# Patient Record
Sex: FEMALE | Race: BLACK OR AFRICAN AMERICAN
[De-identification: names, ages, dates, MRNs, and addresses within clinical notes are randomized per-mention and may not be internally consistent; named-entity substitution may affect disease eponyms.]

---

## 2017-12-29 ENCOUNTER — APPOINTMENT (OUTPATIENT)
Dept: RADIOLOGY | Facility: IMAGING CENTER | Age: 62
End: 2017-12-29
Payer: COMMERCIAL

## 2017-12-29 ENCOUNTER — OUTPATIENT (OUTPATIENT)
Dept: OUTPATIENT SERVICES | Facility: HOSPITAL | Age: 62
LOS: 1 days | End: 2017-12-29
Payer: COMMERCIAL

## 2017-12-29 ENCOUNTER — APPOINTMENT (OUTPATIENT)
Dept: MAMMOGRAPHY | Facility: IMAGING CENTER | Age: 62
End: 2017-12-29
Payer: COMMERCIAL

## 2017-12-29 DIAGNOSIS — Z00.8 ENCOUNTER FOR OTHER GENERAL EXAMINATION: ICD-10-CM

## 2017-12-29 PROCEDURE — 77067 SCR MAMMO BI INCL CAD: CPT

## 2017-12-29 PROCEDURE — G0202: CPT | Mod: 26

## 2017-12-29 PROCEDURE — 77080 DXA BONE DENSITY AXIAL: CPT | Mod: 26

## 2017-12-29 PROCEDURE — 77063 BREAST TOMOSYNTHESIS BI: CPT | Mod: 26

## 2017-12-29 PROCEDURE — 77080 DXA BONE DENSITY AXIAL: CPT

## 2017-12-29 PROCEDURE — 77063 BREAST TOMOSYNTHESIS BI: CPT

## 2018-03-16 ENCOUNTER — APPOINTMENT (OUTPATIENT)
Dept: UROGYNECOLOGY | Facility: CLINIC | Age: 63
End: 2018-03-16
Payer: COMMERCIAL

## 2018-03-16 VITALS
SYSTOLIC BLOOD PRESSURE: 160 MMHG | DIASTOLIC BLOOD PRESSURE: 90 MMHG | WEIGHT: 163 LBS | HEIGHT: 65 IN | BODY MASS INDEX: 27.16 KG/M2

## 2018-03-16 DIAGNOSIS — N39.41 URGE INCONTINENCE: ICD-10-CM

## 2018-03-16 DIAGNOSIS — R35.0 FREQUENCY OF MICTURITION: ICD-10-CM

## 2018-03-16 DIAGNOSIS — R39.15 URGENCY OF URINATION: ICD-10-CM

## 2018-03-16 DIAGNOSIS — K46.9 UNSPECIFIED ABDOMINAL HERNIA W/OUT OBSTRUCTION OR GANGRENE: ICD-10-CM

## 2018-03-16 LAB
BILIRUB UR QL STRIP: NORMAL
CLARITY UR: CLEAR
COLLECTION METHOD: NORMAL
GLUCOSE UR-MCNC: NORMAL
HCG UR QL: 0.2 EU/DL
HGB UR QL STRIP.AUTO: NORMAL
KETONES UR-MCNC: NORMAL
LEUKOCYTE ESTERASE UR QL STRIP: NORMAL
NITRITE UR QL STRIP: NORMAL
PH UR STRIP: 5.5
PROT UR STRIP-MCNC: NORMAL
SP GR UR STRIP: 1.02

## 2018-03-16 PROCEDURE — 99204 OFFICE O/P NEW MOD 45 MIN: CPT | Mod: 25

## 2018-03-16 PROCEDURE — 51741 ELECTRO-UROFLOWMETRY FIRST: CPT

## 2018-03-16 PROCEDURE — 51725 SIMPLE CYSTOMETROGRAM: CPT

## 2018-03-16 PROCEDURE — 81003 URINALYSIS AUTO W/O SCOPE: CPT | Mod: QW

## 2018-03-16 RX ORDER — LACTULOSE 10 G/15ML
10 SOLUTION ORAL
Qty: 240 | Refills: 0 | Status: DISCONTINUED | COMMUNITY
Start: 2018-01-05

## 2018-03-16 RX ORDER — ATORVASTATIN CALCIUM 10 MG/1
10 TABLET, FILM COATED ORAL
Qty: 30 | Refills: 0 | Status: DISCONTINUED | COMMUNITY
Start: 2017-07-26

## 2018-03-16 RX ORDER — CHLORHEXIDINE GLUCONATE, 0.12% ORAL RINSE 1.2 MG/ML
0.12 SOLUTION DENTAL
Qty: 473 | Refills: 0 | Status: DISCONTINUED | COMMUNITY
Start: 2017-01-18

## 2018-03-16 RX ORDER — AMOXICILLIN 500 MG/1
500 TABLET, FILM COATED ORAL
Qty: 21 | Refills: 0 | Status: DISCONTINUED | COMMUNITY
Start: 2018-02-14

## 2018-03-19 LAB
APPEARANCE: CLEAR
BACTERIA UR CULT: NORMAL
BACTERIA: NEGATIVE
BILIRUBIN URINE: NEGATIVE
BLOOD URINE: NEGATIVE
COLOR: YELLOW
GLUCOSE QUALITATIVE U: NEGATIVE
HYALINE CASTS: 0 /LPF
KETONES URINE: NEGATIVE
LEUKOCYTE ESTERASE URINE: NEGATIVE
MICROSCOPIC-UA: NORMAL
NITRITE URINE: NEGATIVE
PH URINE: 6
PROTEIN URINE: NEGATIVE
RED BLOOD CELLS URINE: 0 /HPF
SPECIFIC GRAVITY URINE: 1.02
SQUAMOUS EPITHELIAL CELLS: 2 /HPF
UROBILINOGEN URINE: NEGATIVE
WHITE BLOOD CELLS URINE: 1 /HPF

## 2018-04-26 ENCOUNTER — APPOINTMENT (OUTPATIENT)
Dept: UROGYNECOLOGY | Facility: CLINIC | Age: 63
End: 2018-04-26

## 2018-09-28 ENCOUNTER — APPOINTMENT (OUTPATIENT)
Dept: ULTRASOUND IMAGING | Facility: IMAGING CENTER | Age: 63
End: 2018-09-28
Payer: COMMERCIAL

## 2018-09-28 ENCOUNTER — OUTPATIENT (OUTPATIENT)
Dept: OUTPATIENT SERVICES | Facility: HOSPITAL | Age: 63
LOS: 1 days | End: 2018-09-28
Payer: COMMERCIAL

## 2018-09-28 DIAGNOSIS — Z00.8 ENCOUNTER FOR OTHER GENERAL EXAMINATION: ICD-10-CM

## 2018-09-28 PROCEDURE — 93975 VASCULAR STUDY: CPT

## 2018-09-28 PROCEDURE — 93975 VASCULAR STUDY: CPT | Mod: 26

## 2019-02-22 ENCOUNTER — OUTPATIENT (OUTPATIENT)
Dept: OUTPATIENT SERVICES | Facility: HOSPITAL | Age: 64
LOS: 1 days | End: 2019-02-22
Payer: COMMERCIAL

## 2019-02-22 ENCOUNTER — APPOINTMENT (OUTPATIENT)
Dept: MAMMOGRAPHY | Facility: IMAGING CENTER | Age: 64
End: 2019-02-22
Payer: COMMERCIAL

## 2019-02-22 DIAGNOSIS — Z00.8 ENCOUNTER FOR OTHER GENERAL EXAMINATION: ICD-10-CM

## 2019-02-22 PROCEDURE — 77067 SCR MAMMO BI INCL CAD: CPT | Mod: 26

## 2019-02-22 PROCEDURE — 77063 BREAST TOMOSYNTHESIS BI: CPT

## 2019-02-22 PROCEDURE — 77067 SCR MAMMO BI INCL CAD: CPT

## 2019-02-22 PROCEDURE — 77063 BREAST TOMOSYNTHESIS BI: CPT | Mod: 26

## 2019-04-17 ENCOUNTER — OUTPATIENT (OUTPATIENT)
Dept: OUTPATIENT SERVICES | Facility: HOSPITAL | Age: 64
LOS: 1 days | End: 2019-04-17
Payer: COMMERCIAL

## 2019-04-17 ENCOUNTER — APPOINTMENT (OUTPATIENT)
Dept: CT IMAGING | Facility: IMAGING CENTER | Age: 64
End: 2019-04-17
Payer: COMMERCIAL

## 2019-04-17 DIAGNOSIS — Z00.8 ENCOUNTER FOR OTHER GENERAL EXAMINATION: ICD-10-CM

## 2019-04-17 PROCEDURE — 71250 CT THORAX DX C-: CPT | Mod: 26

## 2019-04-17 PROCEDURE — 71250 CT THORAX DX C-: CPT

## 2019-08-06 ENCOUNTER — APPOINTMENT (OUTPATIENT)
Dept: OBGYN | Facility: CLINIC | Age: 64
End: 2019-08-06
Payer: COMMERCIAL

## 2019-08-06 VITALS
BODY MASS INDEX: 29.19 KG/M2 | HEIGHT: 64 IN | SYSTOLIC BLOOD PRESSURE: 116 MMHG | DIASTOLIC BLOOD PRESSURE: 60 MMHG | WEIGHT: 171 LBS

## 2019-08-06 DIAGNOSIS — Z12.11 ENCOUNTER FOR SCREENING FOR MALIGNANT NEOPLASM OF COLON: ICD-10-CM

## 2019-08-06 PROCEDURE — 99396 PREV VISIT EST AGE 40-64: CPT

## 2019-08-06 NOTE — HISTORY OF PRESENT ILLNESS
[Postmenopausal] : is postmenopausal [Good] : being in good health [HPV Vaccine NA Due to Age] : HPV vaccine not available to patient due to age [Last Mammogram ___] : Last Mammogram was [unfilled] [Last Bone Density ___] : Last bone density studies [unfilled] [Last Colonoscopy ___] : Last colonoscopy [unfilled] [Last Pap ___] : Last cervical pap smear was [unfilled]

## 2019-08-06 NOTE — COUNSELING
[Nutrition] : nutrition [Breast Self Exam] : breast self exam [Vitamins/Supplements] : vitamins/supplements [Exercise] : exercise [FreeTextEntry2] : Terrance wilocx

## 2019-08-06 NOTE — PHYSICAL EXAM
[Alert] : alert [Awake] : awake [Soft] : soft [Oriented x3] : oriented to person, place, and time [Normal] : cervix [Uterine Adnexae] : were not tender and not enlarged [No Bleeding] : there was no active vaginal bleeding [Acute Distress] : no acute distress [Mass] : no breast mass [Nipple Discharge] : no nipple discharge [Axillary LAD] : no axillary lymphadenopathy [Tender] : non tender

## 2019-08-07 LAB — HPV HIGH+LOW RISK DNA PNL CVX: NOT DETECTED

## 2019-08-11 LAB — CYTOLOGY CVX/VAG DOC THIN PREP: ABNORMAL

## 2020-09-02 ENCOUNTER — APPOINTMENT (OUTPATIENT)
Dept: MAMMOGRAPHY | Facility: IMAGING CENTER | Age: 65
End: 2020-09-02

## 2021-01-07 ENCOUNTER — APPOINTMENT (OUTPATIENT)
Dept: OBGYN | Facility: CLINIC | Age: 66
End: 2021-01-07

## 2021-01-08 ENCOUNTER — APPOINTMENT (OUTPATIENT)
Dept: OBGYN | Facility: CLINIC | Age: 66
End: 2021-01-08
Payer: MEDICARE

## 2021-01-08 VITALS
TEMPERATURE: 97.3 F | BODY MASS INDEX: 27.49 KG/M2 | HEART RATE: 68 BPM | OXYGEN SATURATION: 98 % | DIASTOLIC BLOOD PRESSURE: 80 MMHG | HEIGHT: 64 IN | SYSTOLIC BLOOD PRESSURE: 120 MMHG | WEIGHT: 161 LBS

## 2021-01-08 DIAGNOSIS — R92.2 INCONCLUSIVE MAMMOGRAM: ICD-10-CM

## 2021-01-08 DIAGNOSIS — Z12.39 ENCOUNTER FOR OTHER SCREENING FOR MALIGNANT NEOPLASM OF BREAST: ICD-10-CM

## 2021-01-08 PROCEDURE — G0101: CPT

## 2021-01-08 RX ORDER — ROSUVASTATIN CALCIUM 10 MG/1
10 TABLET, FILM COATED ORAL
Refills: 0 | Status: ACTIVE | COMMUNITY

## 2021-01-08 RX ORDER — CLONIDINE HYDROCHLORIDE 0.2 MG/1
0.2 TABLET ORAL
Qty: 90 | Refills: 0 | Status: DISCONTINUED | COMMUNITY
Start: 2017-10-11 | End: 2021-01-08

## 2021-01-08 RX ORDER — TELMISARTAN AND HYDROCHLOROTHIAZIDE 80; 12.5 MG/1; MG/1
80-12.5 TABLET ORAL
Refills: 0 | Status: ACTIVE | COMMUNITY

## 2021-01-09 LAB — HPV HIGH+LOW RISK DNA PNL CVX: NOT DETECTED

## 2021-01-12 LAB — CYTOLOGY CVX/VAG DOC THIN PREP: ABNORMAL

## 2021-01-24 ENCOUNTER — TRANSCRIPTION ENCOUNTER (OUTPATIENT)
Age: 66
End: 2021-01-24

## 2021-03-03 ENCOUNTER — NON-APPOINTMENT (OUTPATIENT)
Age: 66
End: 2021-03-03

## 2021-05-10 ENCOUNTER — APPOINTMENT (OUTPATIENT)
Dept: GASTROENTEROLOGY | Facility: CLINIC | Age: 66
End: 2021-05-10

## 2023-04-14 NOTE — REASON FOR VISIT
How Severe Are Your Spot(S)?: moderate What Is The Reason For Today's Visit?: Full Body Skin Examination What Is The Reason For Today's Visit? (Being Monitored For X): concerning skin lesions on an annual basis [Annual] : an annual visit.

## 2023-04-21 ENCOUNTER — APPOINTMENT (OUTPATIENT)
Dept: OBGYN | Facility: CLINIC | Age: 68
End: 2023-04-21
Payer: MEDICARE

## 2023-04-21 VITALS — SYSTOLIC BLOOD PRESSURE: 148 MMHG | HEART RATE: 62 BPM | DIASTOLIC BLOOD PRESSURE: 88 MMHG

## 2023-04-21 VITALS
WEIGHT: 160 LBS | HEIGHT: 64 IN | BODY MASS INDEX: 27.31 KG/M2 | SYSTOLIC BLOOD PRESSURE: 158 MMHG | DIASTOLIC BLOOD PRESSURE: 80 MMHG | HEART RATE: 69 BPM

## 2023-04-21 DIAGNOSIS — Z01.419 ENCOUNTER FOR GYNECOLOGICAL EXAMINATION (GENERAL) (ROUTINE) W/OUT ABNORMAL FINDINGS: ICD-10-CM

## 2023-04-21 PROCEDURE — G0101: CPT

## 2023-04-23 LAB — HPV HIGH+LOW RISK DNA PNL CVX: NOT DETECTED

## 2023-04-27 LAB — CYTOLOGY CVX/VAG DOC THIN PREP: NORMAL

## 2023-11-30 ENCOUNTER — NON-APPOINTMENT (OUTPATIENT)
Age: 68
End: 2023-11-30

## 2023-12-06 ENCOUNTER — TRANSCRIPTION ENCOUNTER (OUTPATIENT)
Age: 68
End: 2023-12-06

## 2023-12-06 ENCOUNTER — NON-APPOINTMENT (OUTPATIENT)
Age: 68
End: 2023-12-06

## 2023-12-06 DIAGNOSIS — N60.99 UNSPECIFIED BENIGN MAMMARY DYSPLASIA OF UNSPECIFIED BREAST: ICD-10-CM

## 2023-12-07 ENCOUNTER — NON-APPOINTMENT (OUTPATIENT)
Age: 68
End: 2023-12-07

## 2024-01-11 ENCOUNTER — APPOINTMENT (OUTPATIENT)
Dept: SURGICAL ONCOLOGY | Facility: CLINIC | Age: 69
End: 2024-01-11
Payer: MEDICARE

## 2024-01-11 VITALS
DIASTOLIC BLOOD PRESSURE: 81 MMHG | HEIGHT: 64 IN | OXYGEN SATURATION: 96 % | RESPIRATION RATE: 16 BRPM | HEART RATE: 59 BPM | SYSTOLIC BLOOD PRESSURE: 150 MMHG | WEIGHT: 164 LBS | BODY MASS INDEX: 28 KG/M2

## 2024-01-11 PROCEDURE — 99205 OFFICE O/P NEW HI 60 MIN: CPT

## 2024-01-15 ENCOUNTER — OUTPATIENT (OUTPATIENT)
Dept: OUTPATIENT SERVICES | Facility: HOSPITAL | Age: 69
LOS: 1 days | End: 2024-01-15
Payer: MEDICARE

## 2024-01-15 ENCOUNTER — APPOINTMENT (OUTPATIENT)
Dept: MRI IMAGING | Facility: CLINIC | Age: 69
End: 2024-01-15
Payer: MEDICARE

## 2024-01-15 DIAGNOSIS — N60.99 UNSPECIFIED BENIGN MAMMARY DYSPLASIA OF UNSPECIFIED BREAST: ICD-10-CM

## 2024-01-15 PROCEDURE — C8937: CPT

## 2024-01-15 PROCEDURE — C8908: CPT

## 2024-01-15 PROCEDURE — A9585: CPT

## 2024-01-15 PROCEDURE — 77049 MRI BREAST C-+ W/CAD BI: CPT | Mod: 26

## 2024-01-18 ENCOUNTER — OUTPATIENT (OUTPATIENT)
Dept: OUTPATIENT SERVICES | Facility: HOSPITAL | Age: 69
LOS: 1 days | End: 2024-01-18
Payer: MEDICARE

## 2024-01-18 VITALS
OXYGEN SATURATION: 95 % | HEART RATE: 64 BPM | DIASTOLIC BLOOD PRESSURE: 88 MMHG | TEMPERATURE: 98 F | WEIGHT: 161.82 LBS | RESPIRATION RATE: 16 BRPM | SYSTOLIC BLOOD PRESSURE: 167 MMHG | HEIGHT: 64 IN

## 2024-01-18 DIAGNOSIS — I10 ESSENTIAL (PRIMARY) HYPERTENSION: ICD-10-CM

## 2024-01-18 DIAGNOSIS — Z01.818 ENCOUNTER FOR OTHER PREPROCEDURAL EXAMINATION: ICD-10-CM

## 2024-01-18 DIAGNOSIS — E78.5 HYPERLIPIDEMIA, UNSPECIFIED: ICD-10-CM

## 2024-01-18 DIAGNOSIS — N60.89 OTHER BENIGN MAMMARY DYSPLASIAS OF UNSPECIFIED BREAST: ICD-10-CM

## 2024-01-18 DIAGNOSIS — N60.99 UNSPECIFIED BENIGN MAMMARY DYSPLASIA OF UNSPECIFIED BREAST: ICD-10-CM

## 2024-01-18 LAB
ANION GAP SERPL CALC-SCNC: 8 MMOL/L — SIGNIFICANT CHANGE UP (ref 5–17)
BUN SERPL-MCNC: 14 MG/DL — SIGNIFICANT CHANGE UP (ref 7–23)
CALCIUM SERPL-MCNC: 9.5 MG/DL — SIGNIFICANT CHANGE UP (ref 8.4–10.5)
CHLORIDE SERPL-SCNC: 104 MMOL/L — SIGNIFICANT CHANGE UP (ref 96–108)
CO2 SERPL-SCNC: 29 MMOL/L — SIGNIFICANT CHANGE UP (ref 22–31)
CREAT SERPL-MCNC: 0.75 MG/DL — SIGNIFICANT CHANGE UP (ref 0.5–1.3)
EGFR: 87 ML/MIN/1.73M2 — SIGNIFICANT CHANGE UP
GLUCOSE SERPL-MCNC: 89 MG/DL — SIGNIFICANT CHANGE UP (ref 70–99)
HCT VFR BLD CALC: 43.2 % — SIGNIFICANT CHANGE UP (ref 34.5–45)
HGB BLD-MCNC: 14.4 G/DL — SIGNIFICANT CHANGE UP (ref 11.5–15.5)
MCHC RBC-ENTMCNC: 30.1 PG — SIGNIFICANT CHANGE UP (ref 27–34)
MCHC RBC-ENTMCNC: 33.3 GM/DL — SIGNIFICANT CHANGE UP (ref 32–36)
MCV RBC AUTO: 90.2 FL — SIGNIFICANT CHANGE UP (ref 80–100)
NRBC # BLD: 0 /100 WBCS — SIGNIFICANT CHANGE UP (ref 0–0)
PLATELET # BLD AUTO: 265 K/UL — SIGNIFICANT CHANGE UP (ref 150–400)
POTASSIUM SERPL-MCNC: 4.1 MMOL/L — SIGNIFICANT CHANGE UP (ref 3.5–5.3)
POTASSIUM SERPL-SCNC: 4.1 MMOL/L — SIGNIFICANT CHANGE UP (ref 3.5–5.3)
RBC # BLD: 4.79 M/UL — SIGNIFICANT CHANGE UP (ref 3.8–5.2)
RBC # FLD: 12.1 % — SIGNIFICANT CHANGE UP (ref 10.3–14.5)
SODIUM SERPL-SCNC: 141 MMOL/L — SIGNIFICANT CHANGE UP (ref 135–145)
WBC # BLD: 5.84 K/UL — SIGNIFICANT CHANGE UP (ref 3.8–10.5)
WBC # FLD AUTO: 5.84 K/UL — SIGNIFICANT CHANGE UP (ref 3.8–10.5)

## 2024-01-18 PROCEDURE — G0463: CPT

## 2024-01-18 PROCEDURE — 36415 COLL VENOUS BLD VENIPUNCTURE: CPT

## 2024-01-18 PROCEDURE — 80048 BASIC METABOLIC PNL TOTAL CA: CPT

## 2024-01-18 PROCEDURE — 93010 ELECTROCARDIOGRAM REPORT: CPT | Mod: NC

## 2024-01-18 PROCEDURE — 85027 COMPLETE CBC AUTOMATED: CPT

## 2024-01-18 PROCEDURE — 93005 ELECTROCARDIOGRAM TRACING: CPT

## 2024-01-18 NOTE — H&P PST ADULT - NSANTHOSAYNRD_GEN_A_CORE
neck 15.5inches/No. MARLEEN screening performed.  STOP BANG Legend: 0-2 = LOW Risk; 3-4 = INTERMEDIATE Risk; 5-8 = HIGH Risk

## 2024-01-18 NOTE — H&P PST ADULT - NSICDXPASTMEDICALHX_GEN_ALL_CORE_FT
PAST MEDICAL HISTORY:  HTN (Hypertension)     Hyperlipidemia     Mass of right breast on mammogram     Uterine Leiomyoma

## 2024-01-18 NOTE — H&P PST ADULT - TEMPERATURE IN FAHRENHEIT (DEGREES F)
This patient is scheduled to follow up with you for HTN. She has a hx of elevated BP which was better with her pre-hospital regimen (included minoxidil). She was seen today for follow up and her BP is still very high. Can call or text me when you see her. She will return to my office for a few nurse visit to check BP.
98.2

## 2024-01-18 NOTE — H&P PST ADULT - PROBLEM SELECTOR PLAN 2
Pt instructed to take meds as prescribed - elevated BP during PST eval  Pending: M/C Pt instructed to take meds as prescribed

## 2024-01-18 NOTE — H&P PST ADULT - NSICDXFAMILYHX_GEN_ALL_CORE_FT
FAMILY HISTORY:  Sibling  Still living? Yes, Estimated age: Age Unknown  Family history of breast cancer in sister, Age at diagnosis: Age Unknown

## 2024-01-18 NOTE — H&P PST ADULT - HISTORY OF PRESENT ILLNESS
67yo female with medical HTN and HDL, reports annual mammogram indicate Benign mammary dysplasia, right breast and present today for PST for scheduled for Excision Right Breast Atypia on 1/24/2024

## 2024-01-18 NOTE — H&P PST ADULT - NEUROLOGICAL
Patient and family requesting cough syrup to go home with. OTC medications have not helped. madeleine Bills stated she will talk with Dr. Stephie Bolivar about this. Pt waiting on this before being discharged home. cranial nerves II-XII intact/sensation intact negative

## 2024-01-18 NOTE — H&P PST ADULT - PROBLEM SELECTOR PLAN 1
Pre-op instructions given. Pt verbalized understanding  Chlorhexidine wash instructions given  Pt instructed to hold all NSAIDs + herbal supplements/vitamins 7 days before surgery  Pending: lab results  Pending: M/C

## 2024-01-18 NOTE — H&P PST ADULT - NSICDXPASTSURGICALHX_GEN_ALL_CORE_FT
PAST SURGICAL HISTORY:  Dilatation curettage x2 approx. 8 years ago    Inguinal Hernia repair- right 1980's    Tubal Ligation

## 2024-01-18 NOTE — H&P PST ADULT - ATTENDING COMMENTS
68-year-old lady with atypical lobular hyperplasia of the right breast diagnosed on needle biopsy.  She is scheduled for excision with preoperative localization.    oncologic diagnosis, surgical approach, risks, benefits and possible surgical outcomes reviewed in detail, all questions answered.    Consent on chart

## 2024-01-19 ENCOUNTER — RESULT REVIEW (OUTPATIENT)
Age: 69
End: 2024-01-19

## 2024-01-19 ENCOUNTER — OUTPATIENT (OUTPATIENT)
Dept: OUTPATIENT SERVICES | Facility: HOSPITAL | Age: 69
LOS: 1 days | End: 2024-01-19
Payer: MEDICARE

## 2024-01-19 DIAGNOSIS — C80.1 MALIGNANT (PRIMARY) NEOPLASM, UNSPECIFIED: ICD-10-CM

## 2024-01-19 PROCEDURE — 88321 CONSLTJ&REPRT SLD PREP ELSWR: CPT

## 2024-01-20 PROBLEM — N63.10 UNSPECIFIED LUMP IN THE RIGHT BREAST, UNSPECIFIED QUADRANT: Chronic | Status: ACTIVE | Noted: 2024-01-18

## 2024-01-20 PROBLEM — E78.5 HYPERLIPIDEMIA, UNSPECIFIED: Chronic | Status: ACTIVE | Noted: 2024-01-18

## 2024-01-22 LAB — SURGICAL PATHOLOGY STUDY: SIGNIFICANT CHANGE UP

## 2024-01-23 ENCOUNTER — TRANSCRIPTION ENCOUNTER (OUTPATIENT)
Age: 69
End: 2024-01-23

## 2024-01-24 ENCOUNTER — RESULT REVIEW (OUTPATIENT)
Age: 69
End: 2024-01-24

## 2024-01-24 ENCOUNTER — OUTPATIENT (OUTPATIENT)
Dept: OUTPATIENT SERVICES | Facility: HOSPITAL | Age: 69
LOS: 1 days | End: 2024-01-24
Payer: MEDICARE

## 2024-01-24 ENCOUNTER — TRANSCRIPTION ENCOUNTER (OUTPATIENT)
Age: 69
End: 2024-01-24

## 2024-01-24 ENCOUNTER — APPOINTMENT (OUTPATIENT)
Dept: SURGICAL ONCOLOGY | Facility: HOSPITAL | Age: 69
End: 2024-01-24

## 2024-01-24 VITALS
OXYGEN SATURATION: 98 % | DIASTOLIC BLOOD PRESSURE: 85 MMHG | RESPIRATION RATE: 14 BRPM | HEIGHT: 64 IN | TEMPERATURE: 98 F | WEIGHT: 161.82 LBS | SYSTOLIC BLOOD PRESSURE: 132 MMHG | HEART RATE: 66 BPM

## 2024-01-24 VITALS
HEART RATE: 65 BPM | RESPIRATION RATE: 16 BRPM | TEMPERATURE: 97 F | SYSTOLIC BLOOD PRESSURE: 133 MMHG | OXYGEN SATURATION: 98 % | DIASTOLIC BLOOD PRESSURE: 73 MMHG

## 2024-01-24 DIAGNOSIS — N60.99 UNSPECIFIED BENIGN MAMMARY DYSPLASIA OF UNSPECIFIED BREAST: ICD-10-CM

## 2024-01-24 PROCEDURE — 76098 X-RAY EXAM SURGICAL SPECIMEN: CPT | Mod: 26

## 2024-01-24 PROCEDURE — C1889: CPT

## 2024-01-24 PROCEDURE — 88307 TISSUE EXAM BY PATHOLOGIST: CPT

## 2024-01-24 PROCEDURE — 19281 PERQ DEVICE BREAST 1ST IMAG: CPT | Mod: RT

## 2024-01-24 PROCEDURE — 19125 EXCISION BREAST LESION: CPT | Mod: RT

## 2024-01-24 PROCEDURE — 19301 PARTIAL MASTECTOMY: CPT | Mod: RT

## 2024-01-24 PROCEDURE — 76098 X-RAY EXAM SURGICAL SPECIMEN: CPT

## 2024-01-24 PROCEDURE — 88307 TISSUE EXAM BY PATHOLOGIST: CPT | Mod: 26

## 2024-01-24 PROCEDURE — 19281 PERQ DEVICE BREAST 1ST IMAG: CPT

## 2024-01-24 DEVICE — ARISTA 3GR: Type: IMPLANTABLE DEVICE | Site: RIGHT | Status: FUNCTIONAL

## 2024-01-24 RX ORDER — SODIUM CHLORIDE 9 MG/ML
1000 INJECTION, SOLUTION INTRAVENOUS
Refills: 0 | Status: DISCONTINUED | OUTPATIENT
Start: 2024-01-24 | End: 2024-01-24

## 2024-01-24 RX ORDER — HEPARIN SODIUM 5000 [USP'U]/ML
5000 INJECTION INTRAVENOUS; SUBCUTANEOUS ONCE
Refills: 0 | Status: COMPLETED | OUTPATIENT
Start: 2024-01-24 | End: 2024-01-24

## 2024-01-24 RX ORDER — ROSUVASTATIN CALCIUM 5 MG/1
1 TABLET ORAL
Refills: 0 | DISCHARGE

## 2024-01-24 RX ORDER — HYDROMORPHONE HYDROCHLORIDE 2 MG/ML
0.5 INJECTION INTRAMUSCULAR; INTRAVENOUS; SUBCUTANEOUS
Refills: 0 | Status: DISCONTINUED | OUTPATIENT
Start: 2024-01-24 | End: 2024-01-24

## 2024-01-24 RX ORDER — ONDANSETRON 8 MG/1
4 TABLET, FILM COATED ORAL ONCE
Refills: 0 | Status: DISCONTINUED | OUTPATIENT
Start: 2024-01-24 | End: 2024-01-24

## 2024-01-24 RX ORDER — METOPROLOL TARTRATE 50 MG
1 TABLET ORAL
Refills: 0 | DISCHARGE

## 2024-01-24 RX ORDER — HYDROCHLOROTHIAZIDE 25 MG
1 TABLET ORAL
Refills: 0 | DISCHARGE

## 2024-01-24 RX ORDER — HYDROMORPHONE HYDROCHLORIDE 2 MG/ML
1 INJECTION INTRAMUSCULAR; INTRAVENOUS; SUBCUTANEOUS
Refills: 0 | Status: DISCONTINUED | OUTPATIENT
Start: 2024-01-24 | End: 2024-01-24

## 2024-01-24 RX ORDER — TELMISARTAN 20 MG/1
1 TABLET ORAL
Refills: 0 | DISCHARGE

## 2024-01-24 RX ADMIN — HYDROMORPHONE HYDROCHLORIDE 0.5 MILLIGRAM(S): 2 INJECTION INTRAMUSCULAR; INTRAVENOUS; SUBCUTANEOUS at 11:18

## 2024-01-24 RX ADMIN — HEPARIN SODIUM 5000 UNIT(S): 5000 INJECTION INTRAVENOUS; SUBCUTANEOUS at 09:29

## 2024-01-24 RX ADMIN — SODIUM CHLORIDE 50 MILLILITER(S): 9 INJECTION, SOLUTION INTRAVENOUS at 08:06

## 2024-01-24 NOTE — ASU DISCHARGE PLAN (ADULT/PEDIATRIC) - ASU DC SPECIAL INSTRUCTIONSFT
Maintain mammary support for 48 hours after getting home.    At that time, may remove garment, surgical tape, and gauze.    Do not remove Steri-Strips.    May shower after above.    After showering, do not need to reapply a dressing.    Should wear mammary support, or own bra, even at night for the next 5 days.    Dr. Ruiz should call with pathology report in approximately 2 weeks, that conversation will determine further management Maintain mammary support for 48 hours after getting home.    At that time, may remove garment, surgical tape, and gauze.    Do not remove Steri-Strips.    May shower after above.    After showering, do not need to reapply a dressing.    Should wear mammary support, or own bra, even at night for the next 5 days.    Dr. Ruiz should call with pathology report in approximately 2 weeks, that conversation will determine further management    Pain medication is given immediately following your surgery to help with post-operative pain. Upon  your discharge home, we suggest scheduled doses of Acetaminophen (tylenol) every 6 hours and  Ibuprofen (Motrin) every 6 hours, alternating between medications every 3 hours (i.e. Take tylenol  and wait 3 hours, then take Motrin and wait 3 hours, repeat) for the first 3-4 days after surgery. If  you are without significant pain, medications can be taken more infrequently. It is important to  follow dosing instructions on the medication bottle or prescription.

## 2024-01-24 NOTE — BRIEF OPERATIVE NOTE - NSICDXBRIEFPROCEDURE_GEN_ALL_CORE_FT
PROCEDURES:  Breast mass, local excision 24-Jan-2024 10:59:45 right needle localization Elvin Henry

## 2024-01-30 LAB — SURGICAL PATHOLOGY STUDY: SIGNIFICANT CHANGE UP

## 2024-01-31 ENCOUNTER — APPOINTMENT (OUTPATIENT)
Dept: OBGYN | Facility: CLINIC | Age: 69
End: 2024-01-31
Payer: MEDICARE

## 2024-01-31 PROCEDURE — 99213 OFFICE O/P EST LOW 20 MIN: CPT

## 2024-01-31 NOTE — HISTORY OF PRESENT ILLNESS
[FreeTextEntry1] : LUPIS--questions pertaining to anti estrogen options--laening towards Tamoxifen--awaits final path results with Dr. Ruiz

## 2024-01-31 NOTE — REASON FOR VISIT
[Home] : at home, [unfilled] , at the time of the visit. [Medical Office: ([facility])___] : at the medical office located in  [Patient] : the patient [Follow-Up] : a follow-up evaluation of

## 2024-02-15 NOTE — ASSESSMENT
[FreeTextEntry1] : 68-year-old lady.  Newly diagnosed right breast atypical lobular hyperplasia on stereotactic biopsy of suspicious microcalcifications.  Procedure was performed at [facility]. Discs retained and submitted. Pathology is from [facility].  Her breast cancer risk score is 26.4.  I have recommended a baseline breast MRI. Prescription entered/provided.   Diagnosis reviewed. Explained that while atypia is not a cancer, nor premalignant, there are the following concerns: 1.  When diagnosed on needle biopsy, excision is recommended to assure that there are no more significant histologic findings in the adjacent tissues. Indications and technique for excision with preoperative localization reviewed.  They understand and like to proceed with surgery. Paperwork for scheduling submitted after discussing the technique, indications, risk, benefits, alternatives, possible surgical outcomes.  2.  The atypia, as an independent event, increases in individuals' risk of breast cancer, on either side, in the future. Therefore, even if there are no more worrisome findings on surgical pathology, I would recommend for her to meet with medical oncology to discuss systemic risk reduction therapy options................................  Reviewed in detail, all questions answered.  Note dictated to her physicians.   2/15/2024: We spoke on the phone. January 24, 2024, she had excision of atypical lobular hyperplasia from the upper right breast with preoperative localization. Surgical pathology: + Residual ALH. Biopsy site changes with fibrosis. No clinically significant histologic findings.  Presently, no further surgical therapy is warranted.  She recalls my recommendation for a medical oncology evaluation to discuss systemic risk reduction therapy options and would like to pursue this path. Nurse navigators contacted.  I updated her physicians with a dictated note.

## 2024-02-15 NOTE — REASON FOR VISIT
[Initial Consultation] : an initial consultation for [Other: _____] : [unfilled] [FreeTextEntry2] : Right breast (12:00) ALH diagnosed on stereotactic biopsy November 2023 at [facility], breast cancer risk score = 26.4

## 2024-02-15 NOTE — REVIEW OF SYSTEMS
[Negative] : Endocrine [FreeTextEntry5] : Hypertension [FreeTextEntry7] : Iodine allergy [FreeTextEntry1] : Breast atypia

## 2024-04-19 ENCOUNTER — NON-APPOINTMENT (OUTPATIENT)
Age: 69
End: 2024-04-19

## 2024-06-13 ENCOUNTER — OUTPATIENT (OUTPATIENT)
Dept: OUTPATIENT SERVICES | Facility: HOSPITAL | Age: 69
LOS: 1 days | Discharge: HOME | End: 2024-06-13

## 2024-06-13 DIAGNOSIS — N60.91 UNSPECIFIED BENIGN MAMMARY DYSPLASIA OF RIGHT BREAST: ICD-10-CM

## 2024-06-17 ENCOUNTER — APPOINTMENT (OUTPATIENT)
Dept: HEMATOLOGY ONCOLOGY | Facility: CLINIC | Age: 69
End: 2024-06-17
Payer: MEDICARE

## 2024-06-17 VITALS
TEMPERATURE: 97.2 F | HEART RATE: 67 BPM | BODY MASS INDEX: 27.85 KG/M2 | WEIGHT: 163.14 LBS | HEIGHT: 64.17 IN | OXYGEN SATURATION: 98 % | DIASTOLIC BLOOD PRESSURE: 86 MMHG | RESPIRATION RATE: 16 BRPM | SYSTOLIC BLOOD PRESSURE: 150 MMHG

## 2024-06-17 DIAGNOSIS — N60.91 UNSPECIFIED BENIGN MAMMARY DYSPLASIA OF RIGHT BREAST: ICD-10-CM

## 2024-06-17 DIAGNOSIS — Z91.89 OTHER SPECIFIED PERSONAL RISK FACTORS, NOT ELSEWHERE CLASSIFIED: ICD-10-CM

## 2024-06-17 PROCEDURE — 99205 OFFICE O/P NEW HI 60 MIN: CPT

## 2024-06-17 NOTE — HISTORY OF PRESENT ILLNESS
[de-identified] : 68 y/o F with PMH of .... recently diagnosed Atypical lobular hyperplasia (ALH) of right breast, came in for initial consultation for chemo prevention of breast cancer.  She was seen by Sx for an initial consultation for Right breast (12:00) ALH diagnosed on stereotactic biopsy November 2023 at [facility]. Per Sx note,  breast cancer risk score = 26.4.  (https://bcrisktool.cancer.gov/calculator.html)  Race/ethnicity- Age of first menstrual period- Number of children-  Age when she gave birth to her first child- Family h/o-  has a sister with breast cancer at age 55. Her sister had genetic testing, she does not have any deleterious mutations.

## 2024-06-18 ENCOUNTER — NON-APPOINTMENT (OUTPATIENT)
Age: 69
End: 2024-06-18

## 2024-06-18 NOTE — HISTORY OF PRESENT ILLNESS
[de-identified] : Ms. CARRIE LY is 69 year female referred by MARIO RUIZ MD for breast cancer risk assessment and risk reduction management based on Her family history and/or personal risk factors for breast cancer.   Ms. LY initially presented at the time of her routine screening mammogram in 2023:  10/18/2023: Annual, bilateral, mammography at [facility] success: BI-RADS 0. Right breast (12:00) grouped microcalcifications.  10/26/2023: Diagnostic right mammogram at [facility] success: BI-RADS 4. Suspicious microcalcifications in the right breast (12:00).  2023: Stereotactic right breast biopsy at [facility] provided the above diagnosis of right breast atypia.  2023: Bilateral breast ultrasounds at [facility] success: BI-RADS 3. Right breast (12:00): Probable hematoma. 3-month follow-up right breast ultrasound recommended for 2024.--- N/A pre-surgical excision   MRI Breast 1/15/2024: Birads 2.0  Dr. Ruiz performed a Surgical Excision 2024: Very focal atypical LOBULAR hyperplasia   RISK ASSESSMENT FACTORS:   GENERAL: Height: 64 inches Weight: 164 lbs BMI:  28 Age of Menarche:  13 Menopausal Status: Postmenopausal OCP/HRT/Fertility:  N/A Age of Menopause   58 # pregnancies/live birth:   Age of first live birth:                32                                                                            BREAST RISK FACTORS:                                                                                     # Breast Biopsies:    1                                                                       Results of biopsies:  10/2023: ALH left breast Breast Density: heterogeneously dense   FAMILY HISTORY:  A complete family history was obtained including first and second degree relatives Family history is significant for sister with breast cancer @ 55;  Mother stomach cancer  @ 87 Personal Genetic Testing Results if applicable:   Family Genetic Testing Results:  Sister reportedly negative for genetic mutations (no report available for review)   SOCIAL HISTORY:  Patient denies history of smoking and regular ETOH use.   Works out 3-4 x week: Chair yoga, stationary bike and walking   IMAGING SUMMARY: Mammogram:  10/20/2023 Breast Ultrasound:  2023 Breast MRI: 1/15/2024 Other Screening:  BMD:  a few years ago     HEALTH TEAM: PCP:  Dr. Roselyn Mccallum GYN:  Dr. Gina Delgado BREAST SURGEON:  Dr. Mario Ruiz        [de-identified] : 6/17/2024 STACIA  denies any breast masses, breast tenderness, skin changes or nipple discharge. Stacia is reluctant to start any medication for fear of it's effect on her cholesterol; she can not tolerate statins and is concerned about worsening arthralgias and rising cholesterol.

## 2024-06-18 NOTE — REASON FOR VISIT
[Initial Consultation] : an initial consultation [FreeTextEntry2] : Breast Cancer Risk Assessment/ALH

## 2024-06-18 NOTE — END OF VISIT
[FreeTextEntry3] : Patient seen and examined with RAUL García Oncology history,medical history, surgical history, family history, social history, OB/GYN history reviewed.  Pathology and imaging reviewed.  Breast exam as above. Patient is at increased risk for breast cancer due to atypical hyperplasia Chemoprevention with aromatase inhibitors and raloxifene reviewed.  She is concerned about arthralgias due to previous reaction to statins.  Recommend raloxifene for chemoprevention.  Side effects reviewed. Patient wants some time to think about before making a decision. Breast imaging surveillance plan d/w her.  Genetic testing discussed F/u in breast wellness program closely.  All questions answered.

## 2024-06-18 NOTE — PHYSICAL EXAM
[Normal] : affect appropriate [de-identified] : s/p right lumpectomy, surgical scars noted and well healed; minimal post-op swelling superior to scar;  No palpable masses b/l, no chest wall changes, no skin or nipple changes noted; no axillary adenopathy

## 2024-06-18 NOTE — ASSESSMENT
[FreeTextEntry1] : STACIA  is a 69 year   year  year old female who presented today to review her risk of developing of breast cancer based on her family history and personal risk factors.  A complete risk assessment was performed and the results suggest that STACIA   is at increased risk for breast cancer.  Ms. LY was diagnosed with atypical lobular hyperplasia.   We discussed that atypical lobular hyperplasia is a high-risk lesion and confers a substantial increase in the risk of subsequent breast cancer in both ipsilateral and contralateral breast.  Atypical lobular hyperplasia is associated with 1-2% annual cumulative risk of developing invasive breast cancer. Given the atypical hyperplasia and high breast cancer risk score, she is a candidate for primary risk reduction medication.  Options for risk reduction medication in a post-menopausal patient include aromatase inhibitors, raloxifene, tamoxifen.   Given that STACIA  is postmenopausal and is concerned about worsening lipid profile and arthralgias that can be associated with anastrozole.  We discussed raloxifene as a reasonable risk reducing option with a baby aspirin to minimize risk of thrombotic events  Studies examining raloxifene as a chemoprevention demonstrated a 50% reduction in risk of invasive breast cancer. Raloxifene's side effects include hot flashes, peripheral edema, leg cramps and a slight increased risk of venousthrombolic effects (0.7% raloxifene group vs. 0.2% placebo).  There is no increased risk of endometrial cancer associated with raloxifene.    A recent meta-analysis of raloxifene treated patient demonstrated a potential beneficial effect on hyperlipidemia (Harris F, Arabella N, Reece JOHNSTON, Jair N. Raloxifene has favorable effects on the lipid profile in women explaining its beneficial effect on cardiovascular risk: A meta-analysis of randomized controlled trials. Pharmacol Res. 2021 Apr;166:312178. doi: 10.1016/j.phrs.2021.959150. Epub 2021 Feb 19. PMID: [id number].)   Empiric Risk Calculations: Tyrer Vianney Score:  25  % LIFETIME risk of developing breast cancer (general population risk 12%) Naomi Risk Score: 5.4 %  5-year Risk (compared to 1.8 % for women same age/race)   Based on this information the following risk reduction/screening plan has been recommended:   RISK REDUCTION PLAN: - Annual mammogram and breast ultrasound - DUE:  10/2024 (ordered diagnostic imaging) - Annual Breast MRI:  DUE -  4/2025 - Genetics: Discuss at future visit or obtain copy of her sister's genetic testing;  - Chemoprevention: - recommended raloxifene 60 mg daily x 5 years; Stacia requests time to consider her treatment decision. - Lifestyle Recommendations:  Limit consumption of alcoholic beverages to no more than one drink equivalent per day; Exercise 150-300 minutes of moderate intensity physical activity per week; Maintain a healthy body weight of 20-25 BMI to help reduce risk of breast cancer.  - continue f/up with gynecologist and PCP for routine medical care. - continue f/up with breast surgeon as recommended - f/up with Breast Wellness Program in 8 weeks to finalize a treatment plan .

## 2024-06-18 NOTE — REVIEW OF SYSTEMS
[Diarrhea: Grade 0] : Diarrhea: Grade 0 [Joint Pain] : joint pain [Muscle Pain] : muscle pain [Negative] : Allergic/Immunologic [FreeTextEntry9] : Pain secondary to statins that interfered with her quality of life

## 2024-06-20 PROBLEM — Z91.89 AT HIGH RISK FOR BREAST CANCER: Status: ACTIVE | Noted: 2024-06-20

## 2024-08-12 ENCOUNTER — NON-APPOINTMENT (OUTPATIENT)
Age: 69
End: 2024-08-12

## 2024-08-12 ENCOUNTER — APPOINTMENT (OUTPATIENT)
Dept: CARDIOLOGY | Facility: CLINIC | Age: 69
End: 2024-08-12
Payer: MEDICARE

## 2024-08-12 VITALS
OXYGEN SATURATION: 98 % | SYSTOLIC BLOOD PRESSURE: 122 MMHG | HEIGHT: 64 IN | BODY MASS INDEX: 27.66 KG/M2 | HEART RATE: 54 BPM | WEIGHT: 162 LBS | DIASTOLIC BLOOD PRESSURE: 73 MMHG

## 2024-08-12 DIAGNOSIS — R06.02 SHORTNESS OF BREATH: ICD-10-CM

## 2024-08-12 DIAGNOSIS — I10 ESSENTIAL (PRIMARY) HYPERTENSION: ICD-10-CM

## 2024-08-12 DIAGNOSIS — E78.00 PURE HYPERCHOLESTEROLEMIA, UNSPECIFIED: ICD-10-CM

## 2024-08-12 DIAGNOSIS — R00.2 PALPITATIONS: ICD-10-CM

## 2024-08-12 DIAGNOSIS — I51.7 CARDIOMEGALY: ICD-10-CM

## 2024-08-12 PROCEDURE — 93000 ELECTROCARDIOGRAM COMPLETE: CPT

## 2024-08-12 PROCEDURE — 99214 OFFICE O/P EST MOD 30 MIN: CPT

## 2024-08-12 PROCEDURE — 99204 OFFICE O/P NEW MOD 45 MIN: CPT

## 2024-08-12 RX ORDER — ROSUVASTATIN CALCIUM 5 MG/1
5 TABLET, FILM COATED ORAL
Qty: 90 | Refills: 0 | Status: ACTIVE | COMMUNITY
Start: 2024-08-12 | End: 1900-01-01

## 2024-08-12 NOTE — HISTORY OF PRESENT ILLNESS
[FreeTextEntry1] : reluctantly started Rosuvastatin, has mild LARA, and occasional palpitations , No chest pain ,very reluctant to take medications No syncope , No claudication , No exertional Chest pain

## 2024-08-12 NOTE — REVIEW OF SYSTEMS
[SOB] : shortness of breath [Dyspnea on exertion] : dyspnea during exertion [Negative] : Psychiatric [Chest Discomfort] : no chest discomfort [Lower Ext Edema] : no extremity edema [Leg Claudication] : no intermittent leg claudication [Palpitations] : no palpitations [Orthopnea] : no orthopnea [PND] : no PND [Syncope] : no syncope

## 2024-08-12 NOTE — CARDIOLOGY SUMMARY
[de-identified] : Reviewed by me  NSR at 82, LVH by Bennett and AVL ,Poor R progression ,  nonspecific ST and T changes

## 2024-08-12 NOTE — PHYSICAL EXAM
[Well Developed] : well developed [Well Nourished] : well nourished [Normal Venous Pressure] : normal venous pressure [No Carotid Bruit] : no carotid bruit [Normal S1, S2] : normal S1, S2 [S4] : S4 [Murmur] : murmur [Clear Lung Fields] : clear lung fields [Good Air Entry] : good air entry [No Edema] : no edema [No Cyanosis] : no cyanosis [Normal Radial B/L] : normal radial B/L [Normal PT B/L] : normal PT B/L [Normal DP B/L] : normal DP B/L [Normal] : moves all extremities, no focal deficits, normal speech [Alert and Oriented] : alert and oriented [de-identified] : ESM

## 2024-08-12 NOTE — ASSESSMENT
[FreeTextEntry1] : Hypertension , Well controlled, to continue medication SOB will get ECHO to check LVF  Hyperlipidemia LDL at 171, Goal is less than 100 , will repeat on statin LVH on EKG will get ECHO

## 2024-08-14 ENCOUNTER — APPOINTMENT (OUTPATIENT)
Dept: OBGYN | Facility: CLINIC | Age: 69
End: 2024-08-14
Payer: MEDICARE

## 2024-08-14 VITALS
SYSTOLIC BLOOD PRESSURE: 112 MMHG | WEIGHT: 160 LBS | HEIGHT: 64 IN | DIASTOLIC BLOOD PRESSURE: 76 MMHG | BODY MASS INDEX: 27.31 KG/M2 | HEART RATE: 87 BPM

## 2024-08-14 DIAGNOSIS — N95.9 UNSPECIFIED MENOPAUSAL AND PERIMENOPAUSAL DISORDER: ICD-10-CM

## 2024-08-14 DIAGNOSIS — R39.15 URGENCY OF URINATION: ICD-10-CM

## 2024-08-14 PROCEDURE — 99212 OFFICE O/P EST SF 10 MIN: CPT

## 2024-08-14 PROCEDURE — 99459 PELVIC EXAMINATION: CPT

## 2024-08-14 NOTE — PHYSICAL EXAM
[Chaperone Present] : A chaperone was present in the examining room during all aspects of the physical examination [[id number]] : A chaperone was present during the pelvic exam. [FreeTextEntry2] : nabil raphael [Appropriately responsive] : appropriately responsive [Alert] : alert [No Acute Distress] : no acute distress [No Lymphadenopathy] : no lymphadenopathy [Soft] : soft [Non-tender] : non-tender [Non-distended] : non-distended [No HSM] : No HSM [No Lesions] : no lesions [No Mass] : no mass [Oriented x3] : oriented x3 [Examination Of The Breasts] : a normal appearance [No Masses] : no breast masses were palpable [Labia Majora] : normal [Labia Minora] : normal [Normal] : normal [Uterine Adnexae] : normal

## 2024-08-14 NOTE — PHYSICAL EXAM
[Chaperone Present] : A chaperone was present in the examining room during all aspects of the physical examination [93047] : A chaperone was present during the pelvic exam. [FreeTextEntry2] : nabil raphael [Appropriately responsive] : appropriately responsive [Alert] : alert [No Acute Distress] : no acute distress [No Lymphadenopathy] : no lymphadenopathy [Soft] : soft [Non-tender] : non-tender [Non-distended] : non-distended [No HSM] : No HSM [No Lesions] : no lesions [No Mass] : no mass [Oriented x3] : oriented x3 [Examination Of The Breasts] : a normal appearance [No Masses] : no breast masses were palpable [Labia Majora] : normal [Labia Minora] : normal [Normal] : normal [Uterine Adnexae] : normal

## 2024-08-14 NOTE — PHYSICAL EXAM
[Chaperone Present] : A chaperone was present in the examining room during all aspects of the physical examination [[id number]] : A chaperone was present during the pelvic exam. [FreeTextEntry2] : nabil raphael [Appropriately responsive] : appropriately responsive [Alert] : alert [No Acute Distress] : no acute distress [No Lymphadenopathy] : no lymphadenopathy [Soft] : soft [Non-tender] : non-tender [Non-distended] : non-distended [No HSM] : No HSM [No Lesions] : no lesions [No Mass] : no mass [Oriented x3] : oriented x3 [Examination Of The Breasts] : a normal appearance [No Masses] : no breast masses were palpable [Labia Majora] : normal [Labia Minora] : normal [Normal] : normal [Uterine Adnexae] : normal

## 2024-08-14 NOTE — PLAN
[FreeTextEntry1] : Differential diagnosis, work up ,management and evaluation of above mentioned concerns extensively reviewed Extensive review of above mentioned concerns. All questions and concerns addressed, patient expressed understanding. Encouraged to contact the office with any questions or concerns. pt declines  consult\ interested in Lily and beh modif--info provided

## 2024-08-15 ENCOUNTER — OUTPATIENT (OUTPATIENT)
Dept: OUTPATIENT SERVICES | Facility: HOSPITAL | Age: 69
LOS: 1 days | Discharge: HOME | End: 2024-08-15

## 2024-08-15 DIAGNOSIS — N60.91 UNSPECIFIED BENIGN MAMMARY DYSPLASIA OF RIGHT BREAST: ICD-10-CM

## 2024-08-15 LAB — BACTERIA UR CULT: NORMAL

## 2024-08-28 ENCOUNTER — NON-APPOINTMENT (OUTPATIENT)
Age: 69
End: 2024-08-28

## 2024-08-29 ENCOUNTER — APPOINTMENT (OUTPATIENT)
Dept: HEMATOLOGY ONCOLOGY | Facility: CLINIC | Age: 69
End: 2024-08-29

## 2024-08-29 VITALS
BODY MASS INDEX: 27.62 KG/M2 | DIASTOLIC BLOOD PRESSURE: 83 MMHG | TEMPERATURE: 97.3 F | SYSTOLIC BLOOD PRESSURE: 126 MMHG | WEIGHT: 160.93 LBS | OXYGEN SATURATION: 96 % | HEART RATE: 59 BPM | RESPIRATION RATE: 14 BRPM

## 2024-08-29 DIAGNOSIS — Z91.89 OTHER SPECIFIED PERSONAL RISK FACTORS, NOT ELSEWHERE CLASSIFIED: ICD-10-CM

## 2024-08-29 DIAGNOSIS — N60.91 UNSPECIFIED BENIGN MAMMARY DYSPLASIA OF RIGHT BREAST: ICD-10-CM

## 2024-08-29 PROCEDURE — 99213 OFFICE O/P EST LOW 20 MIN: CPT

## 2024-08-29 NOTE — HISTORY OF PRESENT ILLNESS
[de-identified] : Ms. CARRIE LY is 69 year female referred by MARIO RUIZ MD for breast cancer risk assessment and risk reduction management based on Her family history and/or personal risk factors for breast cancer.   Ms. LY initially presented at the time of her routine screening mammogram in 2023:  10/18/2023: Annual, bilateral, mammography at [facility] success: BI-RADS 0. Right breast (12:00) grouped microcalcifications.  10/26/2023: Diagnostic right mammogram at [facility] success: BI-RADS 4. Suspicious microcalcifications in the right breast (12:00).  2023: Stereotactic right breast biopsy at [facility] provided the above diagnosis of right breast atypia.  2023: Bilateral breast ultrasounds at [facility] success: BI-RADS 3. Right breast (12:00): Probable hematoma. 3-month follow-up right breast ultrasound recommended for 2024.--- N/A pre-surgical excision   MRI Breast 1/15/2024: Birads 2.0  Dr. Ruiz performed a Surgical Excision 2024: Very focal atypical LOBULAR hyperplasia   RISK ASSESSMENT FACTORS:   GENERAL: Height: 64 inches Weight: 164 lbs BMI:  28 Age of Menarche:  13 Menopausal Status: Postmenopausal OCP/HRT/Fertility:  N/A Age of Menopause   58 # pregnancies/live birth:   Age of first live birth:                32                                                                            BREAST RISK FACTORS:                                                                                     # Breast Biopsies:    1                                                                       Results of biopsies:  10/2023: ALH left breast Breast Density: heterogeneously dense   FAMILY HISTORY:  A complete family history was obtained including first and second degree relatives Family history is significant for sister with breast cancer @ 55;  Mother stomach cancer  @ 87 Personal Genetic Testing Results if applicable:   Family Genetic Testing Results:  Sister reportedly negative for genetic mutations (no report available for review)   SOCIAL HISTORY:  Patient denies history of smoking and regular ETOH use.   Works out 3-4 x week: Chair yoga, stationary bike and walking   IMAGING SUMMARY: Mammogram:  10/20/2023 Breast Ultrasound:  2023 Breast MRI: 1/15/2024 Other Screening:  BMD:  a few years ago     HEALTH TEAM: PCP:  Dr. Roselyn Mccallum GYN:  Dr. Gina Delgado BREAST SURGEON:  Dr. Mario Ruiz        [de-identified] : 8/29/2024 Patient presents today for management of her high risk of developing breast cancer STACIA  denies any breast masses, breast tenderness, skin changes or nipple discharge. Stacia is reluctant to start any medication for fear of it's effect on her cholesterol; she did recently start rosuvastatin at 5 mg and seems to be tolerating reasonably well. She is not interested in starting risk reducing medication She was unable to obtain genetic testing results from her sister.

## 2024-08-29 NOTE — REVIEW OF SYSTEMS
[Diarrhea: Grade 0] : Diarrhea: Grade 0 [Joint Pain] : no joint pain [Muscle Pain] : no muscle pain [Negative] : Musculoskeletal

## 2024-08-29 NOTE — PHYSICAL EXAM
[Normal] : affect appropriate [de-identified] : s/p right lumpectomy, surgical scars noted and well healed; residual post-op swelling superior to scar;  No palpable masses b/l, no chest wall changes, no skin or nipple changes noted; no axillary adenopathy

## 2024-08-29 NOTE — ASSESSMENT
[FreeTextEntry1] : CARRIE  is a 69 year   year  year old female who presented today to review her risk of developing of breast cancer based on her family history and personal risk factors.  A complete risk assessment was performed and the results suggest that CARRIE   is at increased risk for breast cancer.  Ms. LY was diagnosed with atypical lobular hyperplasia.   We discussed that atypical lobular hyperplasia is a high-risk lesion and confers a substantial increase in the risk of subsequent breast cancer in both ipsilateral and contralateral breast.  Atypical lobular hyperplasia is associated with 1-2% annual cumulative risk of developing invasive breast cancer. Given the atypical hyperplasia and high breast cancer risk score, she is a candidate for primary risk reduction medication.  Options for risk reduction medication in a post-menopausal patient include aromatase inhibitors, raloxifene, tamoxifen.   Given that CARRIE  is postmenopausal and is concerned about worsening lipid profile and arthralgias that can be associated with anastrozole.  We discussed raloxifene as a reasonable risk reducing option with a baby aspirin to minimize risk of thrombotic events  Studies examining raloxifene as a chemoprevention demonstrated a 50% reduction in risk of invasive breast cancer. Raloxifene's side effects include hot flashes, peripheral edema, leg cramps and a slight increased risk of venousthrombolic effects (0.7% raloxifene group vs. 0.2% placebo).  There is no increased risk of endometrial cancer associated with raloxifene.    A recent meta-analysis of raloxifene treated patient demonstrated a potential beneficial effect on hyperlipidemia (Harris F, Arabella N, Reece JOHNSTON, Jair N. Raloxifene has favorable effects on the lipid profile in women explaining its beneficial effect on cardiovascular risk: A meta-analysis of randomized controlled trials. Pharmacol Res. 2021 Apr;166:970275. doi: 10.1016/[email or web address].2021.609040. Epub 2021 Feb 19. PMID: [id number].)   Empiric Risk Calculations: Tyrer Vianney Score:  25  % LIFETIME risk of developing breast cancer (general population risk 12%) Naomi Risk Score: 5.4 %  5-year Risk (compared to 1.8 % for women same age/race)   Based on this information the following risk reduction/screening plan has been recommended:   RISK REDUCTION PLAN: - Annual mammogram - DUE:  10/2024 (ordered diagnostic imaging) - Annual Breast MRI:  DUE -  4/2025 - Genetics: Unable to obtain copy of her sister's genetic testing - defers testing at this time.  - Chemoprevention: - recommended raloxifene 60 mg daily x 5 years; Declines at this time.  - Lifestyle Recommendations:  Limit consumption of alcoholic beverages to no more than one drink equivalent per day; Exercise 150-300 minutes of moderate intensity physical activity per week; Maintain a healthy body weight of 20-25 BMI to help reduce risk of breast cancer.  - continue f/up with gynecologist and PCP for routine medical care. - continue f/up with breast surgeon as recommended - f/up with [facility] in 6 months

## 2024-10-10 ENCOUNTER — RESULT REVIEW (OUTPATIENT)
Age: 69
End: 2024-10-10

## 2024-10-10 ENCOUNTER — APPOINTMENT (OUTPATIENT)
Dept: MAMMOGRAPHY | Facility: IMAGING CENTER | Age: 69
End: 2024-10-10
Payer: MEDICARE

## 2024-10-10 ENCOUNTER — APPOINTMENT (OUTPATIENT)
Dept: ULTRASOUND IMAGING | Facility: IMAGING CENTER | Age: 69
End: 2024-10-10
Payer: MEDICARE

## 2024-10-10 ENCOUNTER — OUTPATIENT (OUTPATIENT)
Dept: OUTPATIENT SERVICES | Facility: HOSPITAL | Age: 69
LOS: 1 days | End: 2024-10-10
Payer: MEDICARE

## 2024-10-10 DIAGNOSIS — N60.91 UNSPECIFIED BENIGN MAMMARY DYSPLASIA OF RIGHT BREAST: ICD-10-CM

## 2024-10-10 PROCEDURE — 77067 SCR MAMMO BI INCL CAD: CPT | Mod: 26

## 2024-10-10 PROCEDURE — 76641 ULTRASOUND BREAST COMPLETE: CPT | Mod: 26,50,GA

## 2024-10-10 PROCEDURE — 77063 BREAST TOMOSYNTHESIS BI: CPT | Mod: 26

## 2024-11-11 ENCOUNTER — APPOINTMENT (OUTPATIENT)
Dept: CARDIOLOGY | Facility: CLINIC | Age: 69
End: 2024-11-11
Payer: MEDICARE

## 2024-11-11 VITALS
TEMPERATURE: 97.2 F | BODY MASS INDEX: 27.31 KG/M2 | HEIGHT: 64 IN | DIASTOLIC BLOOD PRESSURE: 78 MMHG | WEIGHT: 160 LBS | SYSTOLIC BLOOD PRESSURE: 121 MMHG | HEART RATE: 59 BPM

## 2024-11-11 DIAGNOSIS — E78.00 PURE HYPERCHOLESTEROLEMIA, UNSPECIFIED: ICD-10-CM

## 2024-11-11 DIAGNOSIS — I10 ESSENTIAL (PRIMARY) HYPERTENSION: ICD-10-CM

## 2024-11-11 DIAGNOSIS — I51.7 CARDIOMEGALY: ICD-10-CM

## 2024-11-11 DIAGNOSIS — I35.1 NONRHEUMATIC AORTIC (VALVE) INSUFFICIENCY: ICD-10-CM

## 2024-11-11 PROCEDURE — 99214 OFFICE O/P EST MOD 30 MIN: CPT

## 2024-11-11 PROCEDURE — 93306 TTE W/DOPPLER COMPLETE: CPT

## 2024-11-20 PROCEDURE — 76641 ULTRASOUND BREAST COMPLETE: CPT

## 2024-11-20 PROCEDURE — 77067 SCR MAMMO BI INCL CAD: CPT

## 2024-11-20 PROCEDURE — 77063 BREAST TOMOSYNTHESIS BI: CPT

## 2025-01-31 ENCOUNTER — OUTPATIENT (OUTPATIENT)
Dept: OUTPATIENT SERVICES | Facility: HOSPITAL | Age: 70
LOS: 1 days | Discharge: HOME | End: 2025-01-31

## 2025-01-31 DIAGNOSIS — N60.91 UNSPECIFIED BENIGN MAMMARY DYSPLASIA OF RIGHT BREAST: ICD-10-CM

## 2025-02-04 ENCOUNTER — APPOINTMENT (OUTPATIENT)
Dept: HEMATOLOGY ONCOLOGY | Facility: CLINIC | Age: 70
End: 2025-02-04
Payer: MEDICARE

## 2025-02-04 ENCOUNTER — NON-APPOINTMENT (OUTPATIENT)
Age: 70
End: 2025-02-04

## 2025-02-04 VITALS
HEART RATE: 65 BPM | RESPIRATION RATE: 16 BRPM | DIASTOLIC BLOOD PRESSURE: 76 MMHG | BODY MASS INDEX: 27.85 KG/M2 | SYSTOLIC BLOOD PRESSURE: 118 MMHG | OXYGEN SATURATION: 96 % | WEIGHT: 163.14 LBS | HEIGHT: 64.17 IN | TEMPERATURE: 97.2 F

## 2025-02-04 DIAGNOSIS — N60.99 UNSPECIFIED BENIGN MAMMARY DYSPLASIA OF UNSPECIFIED BREAST: ICD-10-CM

## 2025-02-04 PROCEDURE — 99213 OFFICE O/P EST LOW 20 MIN: CPT

## 2025-04-14 ENCOUNTER — APPOINTMENT (OUTPATIENT)
Dept: MRI IMAGING | Facility: IMAGING CENTER | Age: 70
End: 2025-04-14
Payer: MEDICARE

## 2025-04-14 ENCOUNTER — OUTPATIENT (OUTPATIENT)
Dept: OUTPATIENT SERVICES | Facility: HOSPITAL | Age: 70
LOS: 1 days | End: 2025-04-14
Payer: MEDICARE

## 2025-04-14 DIAGNOSIS — N60.99 UNSPECIFIED BENIGN MAMMARY DYSPLASIA OF UNSPECIFIED BREAST: ICD-10-CM

## 2025-04-14 PROCEDURE — C8908: CPT

## 2025-04-14 PROCEDURE — 77049 MRI BREAST C-+ W/CAD BI: CPT | Mod: 26

## 2025-04-14 PROCEDURE — C8937: CPT

## 2025-04-14 PROCEDURE — A9585: CPT

## 2025-05-12 ENCOUNTER — APPOINTMENT (OUTPATIENT)
Dept: CARDIOLOGY | Facility: CLINIC | Age: 70
End: 2025-05-12
Payer: MEDICARE

## 2025-05-12 ENCOUNTER — NON-APPOINTMENT (OUTPATIENT)
Age: 70
End: 2025-05-12

## 2025-05-12 VITALS
WEIGHT: 164 LBS | BODY MASS INDEX: 28 KG/M2 | DIASTOLIC BLOOD PRESSURE: 64 MMHG | HEIGHT: 64.17 IN | SYSTOLIC BLOOD PRESSURE: 111 MMHG | TEMPERATURE: 97.1 F | HEART RATE: 52 BPM

## 2025-05-12 DIAGNOSIS — I35.1 NONRHEUMATIC AORTIC (VALVE) INSUFFICIENCY: ICD-10-CM

## 2025-05-12 DIAGNOSIS — I10 ESSENTIAL (PRIMARY) HYPERTENSION: ICD-10-CM

## 2025-05-12 DIAGNOSIS — R06.02 SHORTNESS OF BREATH: ICD-10-CM

## 2025-05-12 DIAGNOSIS — E78.00 PURE HYPERCHOLESTEROLEMIA, UNSPECIFIED: ICD-10-CM

## 2025-05-12 PROCEDURE — 99214 OFFICE O/P EST MOD 30 MIN: CPT

## 2025-05-12 PROCEDURE — 93000 ELECTROCARDIOGRAM COMPLETE: CPT

## 2025-06-05 ENCOUNTER — NON-APPOINTMENT (OUTPATIENT)
Age: 70
End: 2025-06-05

## 2025-06-05 ENCOUNTER — APPOINTMENT (OUTPATIENT)
Dept: CARDIOLOGY | Facility: CLINIC | Age: 70
End: 2025-06-05
Payer: MEDICARE

## 2025-06-05 VITALS
DIASTOLIC BLOOD PRESSURE: 79 MMHG | WEIGHT: 163 LBS | HEART RATE: 77 BPM | SYSTOLIC BLOOD PRESSURE: 121 MMHG | HEIGHT: 64 IN | BODY MASS INDEX: 27.83 KG/M2

## 2025-06-05 DIAGNOSIS — R06.02 SHORTNESS OF BREATH: ICD-10-CM

## 2025-06-05 DIAGNOSIS — E78.00 PURE HYPERCHOLESTEROLEMIA, UNSPECIFIED: ICD-10-CM

## 2025-06-05 DIAGNOSIS — I35.1 NONRHEUMATIC AORTIC (VALVE) INSUFFICIENCY: ICD-10-CM

## 2025-06-05 DIAGNOSIS — R00.2 PALPITATIONS: ICD-10-CM

## 2025-06-05 DIAGNOSIS — I10 ESSENTIAL (PRIMARY) HYPERTENSION: ICD-10-CM

## 2025-06-05 PROCEDURE — 93246 EXT ECG>7D<15D RECORDING: CPT | Mod: 59

## 2025-06-05 PROCEDURE — 93000 ELECTROCARDIOGRAM COMPLETE: CPT | Mod: 59

## 2025-06-05 PROCEDURE — 99214 OFFICE O/P EST MOD 30 MIN: CPT

## 2025-06-10 ENCOUNTER — APPOINTMENT (OUTPATIENT)
Dept: ORTHOPEDIC SURGERY | Facility: CLINIC | Age: 70
End: 2025-06-10
Payer: MEDICARE

## 2025-06-10 VITALS — WEIGHT: 163 LBS | HEIGHT: 64 IN | BODY MASS INDEX: 27.83 KG/M2

## 2025-06-10 PROBLEM — M67.449 DIGITAL MUCINOUS CYST OF FINGER: Status: ACTIVE | Noted: 2025-06-10

## 2025-06-10 PROBLEM — M19.042 OSTEOARTHRITIS OF FINGER OF LEFT HAND: Status: ACTIVE | Noted: 2025-06-10

## 2025-06-10 PROBLEM — M65.311 TRIGGER FINGER OF RIGHT THUMB: Status: ACTIVE | Noted: 2025-06-10

## 2025-06-10 PROCEDURE — 20612 ASPIRATE/INJ GANGLION CYST: CPT | Mod: LT

## 2025-06-10 PROCEDURE — 20550 NJX 1 TENDON SHEATH/LIGAMENT: CPT | Mod: F5

## 2025-06-10 PROCEDURE — 99203 OFFICE O/P NEW LOW 30 MIN: CPT | Mod: 25

## 2025-06-10 PROCEDURE — J3490M: CUSTOM | Mod: NC,JZ

## 2025-06-23 PROCEDURE — 93248 EXT ECG>7D<15D REV&INTERPJ: CPT | Mod: 52

## 2025-06-24 ENCOUNTER — APPOINTMENT (OUTPATIENT)
Dept: CARDIOLOGY | Facility: CLINIC | Age: 70
End: 2025-06-24
Payer: MEDICARE

## 2025-06-24 VITALS
WEIGHT: 164 LBS | HEART RATE: 63 BPM | SYSTOLIC BLOOD PRESSURE: 133 MMHG | BODY MASS INDEX: 28 KG/M2 | DIASTOLIC BLOOD PRESSURE: 83 MMHG | HEIGHT: 64 IN

## 2025-06-24 PROCEDURE — 99214 OFFICE O/P EST MOD 30 MIN: CPT

## 2025-06-24 RX ORDER — EZETIMIBE 10 MG/1
10 TABLET ORAL
Qty: 90 | Refills: 1 | Status: ACTIVE | COMMUNITY
Start: 2025-06-24 | End: 1900-01-01

## 2025-06-24 RX ORDER — METOPROLOL SUCCINATE 25 MG/1
25 TABLET, EXTENDED RELEASE ORAL DAILY
Qty: 45 | Refills: 0 | Status: ACTIVE | COMMUNITY
Start: 2025-06-24 | End: 1900-01-01

## 2025-06-24 RX ORDER — BEMPEDOIC ACID 180 MG/1
180 TABLET, FILM COATED ORAL
Qty: 90 | Refills: 2 | Status: ACTIVE | COMMUNITY
Start: 2025-06-24 | End: 1900-01-01

## 2025-07-10 ENCOUNTER — APPOINTMENT (OUTPATIENT)
Dept: ORTHOPEDIC SURGERY | Facility: CLINIC | Age: 70
End: 2025-07-10
Payer: MEDICARE

## 2025-07-10 PROBLEM — M20.11 HALLUX VALGUS OF RIGHT FOOT: Status: ACTIVE | Noted: 2025-07-10

## 2025-07-10 PROCEDURE — 99213 OFFICE O/P EST LOW 20 MIN: CPT

## 2025-07-10 PROCEDURE — 73630 X-RAY EXAM OF FOOT: CPT | Mod: RT

## 2025-07-14 ENCOUNTER — APPOINTMENT (OUTPATIENT)
Dept: CARDIOLOGY | Facility: CLINIC | Age: 70
End: 2025-07-14
Payer: MEDICARE

## 2025-07-14 VITALS
WEIGHT: 165 LBS | HEART RATE: 59 BPM | BODY MASS INDEX: 28.17 KG/M2 | DIASTOLIC BLOOD PRESSURE: 89 MMHG | SYSTOLIC BLOOD PRESSURE: 141 MMHG | HEIGHT: 64 IN

## 2025-07-14 PROBLEM — I47.10 PAROXYSMAL SVT (SUPRAVENTRICULAR TACHYCARDIA): Status: ACTIVE | Noted: 2025-06-24

## 2025-07-14 PROBLEM — E78.5 HYPERLIPIDEMIA, UNSPECIFIED HYPERLIPIDEMIA TYPE: Status: ACTIVE | Noted: 2025-06-24

## 2025-07-14 PROCEDURE — 99214 OFFICE O/P EST MOD 30 MIN: CPT

## 2025-08-12 ENCOUNTER — APPOINTMENT (OUTPATIENT)
Dept: HEMATOLOGY ONCOLOGY | Facility: CLINIC | Age: 70
End: 2025-08-12
Payer: MEDICARE

## 2025-08-12 VITALS
RESPIRATION RATE: 16 BRPM | BODY MASS INDEX: 28.68 KG/M2 | TEMPERATURE: 97.5 F | DIASTOLIC BLOOD PRESSURE: 71 MMHG | WEIGHT: 167.11 LBS | HEART RATE: 59 BPM | OXYGEN SATURATION: 99 % | SYSTOLIC BLOOD PRESSURE: 111 MMHG

## 2025-08-12 DIAGNOSIS — N60.91 UNSPECIFIED BENIGN MAMMARY DYSPLASIA OF RIGHT BREAST: ICD-10-CM

## 2025-08-12 DIAGNOSIS — N60.99 UNSPECIFIED BENIGN MAMMARY DYSPLASIA OF UNSPECIFIED BREAST: ICD-10-CM

## 2025-08-12 PROCEDURE — 99213 OFFICE O/P EST LOW 20 MIN: CPT

## 2025-08-15 ENCOUNTER — APPOINTMENT (OUTPATIENT)
Dept: OBGYN | Facility: CLINIC | Age: 70
End: 2025-08-15
Payer: MEDICARE

## 2025-08-15 VITALS
DIASTOLIC BLOOD PRESSURE: 79 MMHG | HEART RATE: 66 BPM | SYSTOLIC BLOOD PRESSURE: 137 MMHG | BODY MASS INDEX: 28.51 KG/M2 | HEIGHT: 64 IN | WEIGHT: 167 LBS

## 2025-08-15 DIAGNOSIS — Z01.419 ENCOUNTER FOR GYNECOLOGICAL EXAMINATION (GENERAL) (ROUTINE) W/OUT ABNORMAL FINDINGS: ICD-10-CM

## 2025-08-15 DIAGNOSIS — Z13.820 ENCOUNTER FOR SCREENING FOR OSTEOPOROSIS: ICD-10-CM

## 2025-08-15 DIAGNOSIS — N60.02 SOLITARY CYST OF RIGHT BREAST: ICD-10-CM

## 2025-08-15 DIAGNOSIS — N60.01 SOLITARY CYST OF RIGHT BREAST: ICD-10-CM

## 2025-08-15 PROCEDURE — G0101: CPT

## 2025-08-16 LAB
APPEARANCE: CLEAR
BACTERIA UR CULT: NORMAL
BACTERIA: NEGATIVE /HPF
BILIRUBIN URINE: NEGATIVE
BLOOD URINE: NEGATIVE
CAST: 0 /LPF
COLOR: YELLOW
EPITHELIAL CELLS: 2 /HPF
GLUCOSE QUALITATIVE U: NEGATIVE MG/DL
KETONES URINE: NEGATIVE MG/DL
LEUKOCYTE ESTERASE URINE: NEGATIVE
MICROSCOPIC-UA: NORMAL
NITRITE URINE: NEGATIVE
PH URINE: 6.5
PROTEIN URINE: NEGATIVE MG/DL
RED BLOOD CELLS URINE: 2 /HPF
SPECIFIC GRAVITY URINE: 1.02
UROBILINOGEN URINE: 0.2 MG/DL
WHITE BLOOD CELLS URINE: 0 /HPF

## 2025-08-18 LAB — HPV HIGH+LOW RISK DNA PNL CVX: NOT DETECTED

## 2025-08-21 LAB — CYTOLOGY CVX/VAG DOC THIN PREP: NORMAL

## 2025-09-15 ENCOUNTER — APPOINTMENT (OUTPATIENT)
Dept: CARDIOLOGY | Facility: CLINIC | Age: 70
End: 2025-09-15
Payer: MEDICARE

## 2025-09-15 VITALS
HEIGHT: 64 IN | HEART RATE: 53 BPM | WEIGHT: 164 LBS | SYSTOLIC BLOOD PRESSURE: 126 MMHG | OXYGEN SATURATION: 98 % | DIASTOLIC BLOOD PRESSURE: 86 MMHG | BODY MASS INDEX: 28 KG/M2

## 2025-09-15 DIAGNOSIS — I47.10 SUPRAVENTRICULAR TACHYCARDIA, UNSPECIFIED: ICD-10-CM

## 2025-09-15 DIAGNOSIS — I35.1 NONRHEUMATIC AORTIC (VALVE) INSUFFICIENCY: ICD-10-CM

## 2025-09-15 DIAGNOSIS — E78.5 HYPERLIPIDEMIA, UNSPECIFIED: ICD-10-CM

## 2025-09-15 DIAGNOSIS — R00.2 PALPITATIONS: ICD-10-CM

## 2025-09-15 DIAGNOSIS — I10 ESSENTIAL (PRIMARY) HYPERTENSION: ICD-10-CM

## 2025-09-15 DIAGNOSIS — I51.7 CARDIOMEGALY: ICD-10-CM

## 2025-09-15 PROCEDURE — 99214 OFFICE O/P EST MOD 30 MIN: CPT

## 2025-09-15 PROCEDURE — 93000 ELECTROCARDIOGRAM COMPLETE: CPT

## 2025-09-15 RX ORDER — HYDROCHLOROTHIAZIDE 12.5 MG/1
12.5 CAPSULE ORAL
Refills: 0 | Status: DISCONTINUED | COMMUNITY
End: 2025-09-15

## 2025-09-15 RX ORDER — HYDROCHLOROTHIAZIDE 12.5 MG/1
12.5 CAPSULE ORAL DAILY
Qty: 90 | Refills: 0 | Status: ACTIVE | COMMUNITY
Start: 2025-09-15 | End: 1900-01-01

## 2025-09-16 LAB
CHOLEST SERPL-MCNC: 251 MG/DL
HDLC SERPL-MCNC: 70 MG/DL
LDLC SERPL-MCNC: 163 MG/DL
NONHDLC SERPL-MCNC: 182 MG/DL
TRIGL SERPL-MCNC: 110 MG/DL

## 2025-09-18 ENCOUNTER — RX RENEWAL (OUTPATIENT)
Age: 70
End: 2025-09-18

## (undated) DEVICE — DRSG COMBINE 5X9"

## (undated) DEVICE — DRSG CURITY GAUZE SPONGE 4 X 4" 12-PLY

## (undated) DEVICE — SOL IRR POUR H2O 250ML

## (undated) DEVICE — GLV 7.5 PROTEXIS (CREAM) NEU-THERA

## (undated) DEVICE — DRAPE CHEST BREAST 106" X 122"

## (undated) DEVICE — SUT MONOCRYL 4-0 27" PS-2 UNDYED

## (undated) DEVICE — GAMMA SLEEVE DISPOSABLE

## (undated) DEVICE — LAP PAD 18 X 18"

## (undated) DEVICE — SUT SOFSILK 2-0 18" C-23

## (undated) DEVICE — VENODYNE/SCD SLEEVE CALF MEDIUM

## (undated) DEVICE — DRAPE 1/2 SHEET 40X57"

## (undated) DEVICE — DRSG MAMMARY SUPPORT LG SIZE 4

## (undated) DEVICE — WARMING BLANKET LOWER ADULT

## (undated) DEVICE — SUT POLYSORB 2-0 30" GS-21 UNDYED

## (undated) DEVICE — MEDICATION LABELS W MARKER

## (undated) DEVICE — SOL IRR POUR NS 0.9% 500ML

## (undated) DEVICE — DRSG MAMMARY SUPPORT MED SIZE 2

## (undated) DEVICE — DRSG MAMMARY SUPPORT MED SIZE 3

## (undated) DEVICE — SUT MONOCRYL 3-0 27" PS-2 UNDYED

## (undated) DEVICE — DRSG TELFA 3 X 8

## (undated) DEVICE — ELCTR STRYKER NEPTUNE SMOKE EVACUATION PENCIL (GREEN)

## (undated) DEVICE — PACK MINOR

## (undated) DEVICE — SYR LUER LOK 10CC

## (undated) DEVICE — DRAPE INSTRUMENT POUCH 6.75" X 11"

## (undated) DEVICE — DRSG TEGADERM 1.75X1.75"

## (undated) DEVICE — DRAPE TOWEL BLUE 17" X 24"

## (undated) DEVICE — SPECIMEN CONTAINER 100ML